# Patient Record
Sex: MALE | Race: WHITE | NOT HISPANIC OR LATINO | Employment: FULL TIME | URBAN - METROPOLITAN AREA
[De-identification: names, ages, dates, MRNs, and addresses within clinical notes are randomized per-mention and may not be internally consistent; named-entity substitution may affect disease eponyms.]

---

## 2020-08-20 ENCOUNTER — CONSULT (OUTPATIENT)
Dept: SLEEP MEDICINE | Facility: HOSPITAL | Age: 30
End: 2020-08-20

## 2020-08-20 VITALS
DIASTOLIC BLOOD PRESSURE: 64 MMHG | SYSTOLIC BLOOD PRESSURE: 137 MMHG | OXYGEN SATURATION: 94 % | RESPIRATION RATE: 16 BRPM | HEIGHT: 77 IN | TEMPERATURE: 97.7 F | WEIGHT: 191.2 LBS | HEART RATE: 43 BPM | BODY MASS INDEX: 22.58 KG/M2

## 2020-08-20 DIAGNOSIS — F12.90 MARIJUANA USE: ICD-10-CM

## 2020-08-20 DIAGNOSIS — G47.10 HYPERSOMNOLENCE: Primary | ICD-10-CM

## 2020-08-20 PROCEDURE — 99204 OFFICE O/P NEW MOD 45 MIN: CPT | Performed by: INTERNAL MEDICINE

## 2020-08-20 NOTE — PROGRESS NOTES
"     New Sleep Patient Office Visit      Patient Name: Jesus Scott    Referring Physician: Jena Hicks NP    Chief Complaint:    Chief Complaint   Patient presents with   • Sleeping Problem       History of Present Illness: Jesus Scott is a 29 y.o. male who is here today to establish care with Sleep Medicine.     29-year-old male coming in for initial evaluation.  Patient states that he is having problem with \"brain fog\" and excessive fatigue going on for little over a year.  States that generally when he wakes up in the morning does not feel rested and he goes out for a run and he feels better for little while but then gets into the same fatigue symptoms again.  Denies any preceding illnesses prior to this happening.  Growing up and no sleep issues.  Denies any symptom complex suggesting of narcolepsy.  No recent fevers.  No history of head trauma.  No history of seizures.  States that he is very active when he is outside a lot.  He has had multiple tick bites but no history of Lyme disease.  Denies any restless leg symptoms.  Denies any REM behavior disorder.  Denies any other parasomnias.    Patient takes about 30 minutes to go to sleep.  He works late in the evening as a .  Gets up work around 11 and goes to bed around 12 or 1 AM.  He then wakes up multiple times during the night sometimes to use the restroom and sometimes because of dry mouth.  He drinks 3-4 beers a night.  He also smokes marijuana nightly as well.  States that he quit drinking and smoking marijuana for about a week but that did not change his symptoms at all.    Patient apparently was sick about 3 weeks ago and he was tested and did not have COVID-19 infection.  But states that now he does feel somewhat dizzy when he gets up quickly but things are improving.  Denies any cataplexy symptoms.     Patient recently had blood work done at his primary care.  His TSH was 1.94, free T4 level was normal.  His comprehensive metabolic panel " was normal.  Hemoglobin was 15.6.    Patient states that lately is kind of feeling depressed more because of COVID-19 pandemic and how his job is affected.  Denies any overt depression.  But his fatigue and fogginess symptoms preceding.      Cowley Scale: 0/24    Estimated average amount of sleep per night: 7-8  Number of times  he wakes up at night: 3-4  Difficulty falling back asleep: yes  It usually takes 30 minutes to go to sleep.  He feels sleepy upon waking up: yes  Rotating or night shift work: no    Drowsiness/Sleepiness:  He exhibits the following:  excessive daytime sleepiness  excessive daytime fatigue  sleepy even when sleep time is increased    Snoring/Breathing:  He exhibits the following:  awoken with dry mouth    Reflux:  He describes the following:  NOne    Narcolepsy:  He exhibits the following:  none    RLS/PLMs:  He describes the following:  none    Insomnia:  He describes the following:  frequent awakenings  restless sleep    Parasomnia:  He exhibits the following:  None    Weight:  Weight change in the last year:  No change    Subjective      Review of Systems:   Review of Systems   Constitutional: Negative.    HENT: Negative.    Respiratory: Positive for chest tightness.    Cardiovascular: Negative.    Gastrointestinal: Negative.    Endocrine: Negative.    Musculoskeletal: Negative.    Skin: Negative.    Allergic/Immunologic: Positive for environmental allergies.   Neurological: Negative.    Hematological: Negative.    Psychiatric/Behavioral: Negative.    All other systems reviewed and are negative.      Past Medical History: History reviewed. No pertinent past medical history.    Past Surgical History: History reviewed. No pertinent surgical history.    Family History:   Family History   Problem Relation Age of Onset   • Pancreatic cancer Father    • Heart disease Maternal Grandmother    • Heart attack Maternal Grandmother    • Heart disease Maternal Grandfather    • Heart attack Maternal  "Grandfather    • Stroke Paternal Grandfather        Social History:   Social History     Socioeconomic History   • Marital status: Single     Spouse name: Not on file   • Number of children: Not on file   • Years of education: Not on file   • Highest education level: Not on file   Tobacco Use   • Smoking status: Current Every Day Smoker     Packs/day: 0.25     Years: 10.00     Pack years: 2.50     Types: Cigarettes     Start date: 2011   • Smokeless tobacco: Never Used   Substance and Sexual Activity   • Alcohol use: Yes     Alcohol/week: 25.0 standard drinks     Types: 25 Cans of beer per week   • Drug use: Yes     Types: Marijuana     Comment: nightly        Medications:   No current outpatient medications on file.    Allergies:   No Known Allergies    Objective     Physical Exam:  Vital Signs:   Vitals:    08/20/20 1056   BP: 137/64   Pulse: (!) 43   Resp: 16   Temp: 97.7 °F (36.5 °C)   TempSrc: Infrared   SpO2: 94%   Weight: 86.7 kg (191 lb 3.2 oz)   Height: 195.6 cm (77\")       Physical Exam   Constitutional: He is oriented to person, place, and time. He appears well-developed and well-nourished. No distress.   HENT:   Head: Normocephalic and atraumatic.   Nose: Nose normal.   Mouth/Throat: Oropharynx is clear and moist. No oropharyngeal exudate.   Thrush: None  Mallampati Score: 2    Eyes: Pupils are equal, round, and reactive to light. EOM are normal. Right eye exhibits no discharge. Left eye exhibits no discharge. No scleral icterus.   Neck: Neck supple. No tracheal deviation present. No thyromegaly present.   Cardiovascular: Normal rate, regular rhythm and normal heart sounds. Exam reveals no friction rub.   No murmur heard.  Pulmonary/Chest: Effort normal and breath sounds normal. No stridor. No respiratory distress. He has no wheezes. He has no rales.   Abdominal: Soft. He exhibits no distension. There is no tenderness. There is no guarding.   Musculoskeletal: He exhibits no edema or tenderness.   "   Clubbing: none   Lymphadenopathy:     He has no cervical adenopathy.   Neurological: He is alert and oriented to person, place, and time. No cranial nerve deficit. Coordination normal.   Skin: He is not diaphoretic.   Psychiatric: He has a normal mood and affect. His behavior is normal. Judgment and thought content normal.   Nursing note and vitals reviewed.      Results Review:     Patient home sleep study report reviewed and patient AHI 0.5.  Lowest saturation 92%.      Assessment / Plan      Assessment:   Problem List Items Addressed This Visit     None      Visit Diagnoses     Hypersomnolence    -  Primary    Relevant Orders    Lyme Disease IgG/IgM Antibodies    Polysomnography 4 or more parameters    Urine Drug Screen - Urine, Clean Catch    Multiple sleep latency test without CPAP    Marijuana use                Plan:      1.  Patient presenting with complains of fatigue and brain fogginess which lasts throughout the day.  Does not feel fully rested in the morning.  Denies any symptom suggesting of narcolepsy.  Differential diagnosis still remains wired with narcolepsy antibiotic hypersomnolence remain in the picture.  Discussed that sleep testing at home setting can sometimes miss mild sleep apnea and we will need to proceed with full night polysomnogram followed by daytime multiple sleep latency testing to evaluate for these disorders.  Sometime he can find other alternative disorders during sleep study as well which we can treat and help him with his quality of life.  He verbalized understanding and is agreeable to proceed with further testing.    2.  Recent blood work reviewed On patient's phone and found that all the other etiologies such as anemia, hypothyroidism have been ruled out.  No kidney disease or liver disease either.  Which is reassuring.  He has a lot of outdoor exposures.  Not sure if he ever had any Lyme disease.  Will send Lyme serology to rule that possibility out for chronic Lyme  disease.    3.  Advised to continue with increasing activity.    4.  Patient was counseled strongly to work towards quitting his alcohol intake and also  quitting marijuana use.  He also smokes quarter pack a day.  He will need to work on that eventually.  Patient states that he has no problem quitting drugs of abuse and hopefully that will help improve his symptoms in the long run.  Patient is comfortable with this plan.      We will go ahead and see him back in Clinic after sleep study and M SLT to go over the results and discuss further management options.  If idiopathic hypersomnolence persisting and if he is off drugs of abuse will consider stimulant medications.    Thank you for allowing us to participate in the care of this patient.  We will follow him closely with you.    Follow Up:   Follow up after PSG and MSLT.     Discussed plan of care in detail with patient today. Patient verbally understands and agrees.      Aidan Nicole MD  Pulmonary Critical Care and Sleep Medicine      Please note that portions of this note may have been completed with a voice recognition program. Efforts were made to edit the dictations, but occasionally words are mistranscribed.

## 2020-10-27 ENCOUNTER — APPOINTMENT (OUTPATIENT)
Dept: PREADMISSION TESTING | Facility: HOSPITAL | Age: 30
End: 2020-10-27

## 2020-10-27 PROCEDURE — U0004 COV-19 TEST NON-CDC HGH THRU: HCPCS

## 2020-10-27 PROCEDURE — C9803 HOPD COVID-19 SPEC COLLECT: HCPCS

## 2020-10-28 LAB — SARS-COV-2 RNA RESP QL NAA+PROBE: NOT DETECTED

## 2020-10-30 ENCOUNTER — HOSPITAL ENCOUNTER (OUTPATIENT)
Dept: SLEEP MEDICINE | Facility: HOSPITAL | Age: 30
Discharge: HOME | End: 2020-10-30
Admitting: INTERNAL MEDICINE

## 2020-10-30 DIAGNOSIS — G47.10 HYPERSOMNOLENCE: ICD-10-CM

## 2020-10-30 PROCEDURE — 95810 POLYSOM 6/> YRS 4/> PARAM: CPT | Performed by: INTERNAL MEDICINE

## 2020-10-30 PROCEDURE — 95810 POLYSOM 6/> YRS 4/> PARAM: CPT

## 2020-10-31 ENCOUNTER — HOSPITAL ENCOUNTER (OUTPATIENT)
Dept: SLEEP MEDICINE | Facility: HOSPITAL | Age: 30
Discharge: HOME | End: 2020-10-31
Admitting: INTERNAL MEDICINE

## 2020-10-31 VITALS
TEMPERATURE: 97.9 F | BODY MASS INDEX: 23.43 KG/M2 | DIASTOLIC BLOOD PRESSURE: 78 MMHG | WEIGHT: 198.41 LBS | HEIGHT: 77 IN | HEART RATE: 66 BPM | RESPIRATION RATE: 16 BRPM | OXYGEN SATURATION: 98 % | SYSTOLIC BLOOD PRESSURE: 120 MMHG

## 2020-10-31 VITALS — BODY MASS INDEX: 23.43 KG/M2 | WEIGHT: 198.41 LBS | HEART RATE: 66 BPM | OXYGEN SATURATION: 97 % | HEIGHT: 77 IN

## 2020-10-31 DIAGNOSIS — G47.10 HYPERSOMNOLENCE: ICD-10-CM

## 2020-10-31 LAB
AMPHET+METHAMPHET UR QL: NEGATIVE
AMPHETAMINES UR QL: NEGATIVE
BARBITURATES UR QL SCN: NEGATIVE
BENZODIAZ UR QL SCN: NEGATIVE
BUPRENORPHINE SERPL-MCNC: NEGATIVE NG/ML
CANNABINOIDS SERPL QL: POSITIVE
COCAINE UR QL: NEGATIVE
METHADONE UR QL SCN: NEGATIVE
OPIATES UR QL: NEGATIVE
OXYCODONE UR QL SCN: NEGATIVE
PCP UR QL SCN: NEGATIVE
PROPOXYPH UR QL: NEGATIVE
TRICYCLICS UR QL SCN: NEGATIVE

## 2020-10-31 PROCEDURE — 95805 MULTIPLE SLEEP LATENCY TEST: CPT

## 2020-10-31 PROCEDURE — 95805 MULTIPLE SLEEP LATENCY TEST: CPT | Performed by: INTERNAL MEDICINE

## 2020-10-31 PROCEDURE — 80306 DRUG TEST PRSMV INSTRMNT: CPT | Performed by: INTERNAL MEDICINE

## 2020-11-09 ENCOUNTER — LAB (OUTPATIENT)
Dept: LAB | Facility: HOSPITAL | Age: 30
End: 2020-11-09

## 2020-11-09 ENCOUNTER — LAB REQUISITION (OUTPATIENT)
Dept: LAB | Facility: HOSPITAL | Age: 30
End: 2020-11-09

## 2020-11-09 ENCOUNTER — OFFICE VISIT (OUTPATIENT)
Dept: SLEEP MEDICINE | Facility: HOSPITAL | Age: 30
End: 2020-11-09

## 2020-11-09 VITALS
OXYGEN SATURATION: 98 % | DIASTOLIC BLOOD PRESSURE: 72 MMHG | HEIGHT: 77 IN | HEART RATE: 51 BPM | SYSTOLIC BLOOD PRESSURE: 142 MMHG | WEIGHT: 199.4 LBS | BODY MASS INDEX: 23.54 KG/M2

## 2020-11-09 DIAGNOSIS — R53.83 FATIGUE, UNSPECIFIED TYPE: Primary | ICD-10-CM

## 2020-11-09 DIAGNOSIS — F12.90 MARIJUANA USE: ICD-10-CM

## 2020-11-09 DIAGNOSIS — Z00.00 ROUTINE GENERAL MEDICAL EXAMINATION AT A HEALTH CARE FACILITY: ICD-10-CM

## 2020-11-09 DIAGNOSIS — F41.8 SITUATIONAL ANXIETY: ICD-10-CM

## 2020-11-09 PROCEDURE — 36415 COLL VENOUS BLD VENIPUNCTURE: CPT | Performed by: INTERNAL MEDICINE

## 2020-11-09 PROCEDURE — 99214 OFFICE O/P EST MOD 30 MIN: CPT | Performed by: INTERNAL MEDICINE

## 2020-11-09 NOTE — PROGRESS NOTES
Follow Up Office Visit      Patient Name: Jesus Scott    Chief Complaint:    Chief Complaint   Patient presents with   • Follow-up       History of Present Illness: Jesus Scott is a 30 y.o. male who is here today for follow up of sleep study    30-year-old male seen in follow-up today.  Patient was initially seen with complains of fatigue and brain fogginess.  We referred him for sleep study followed by multiple sleep latency test.  He comes today for follow-up.  We had also counseled him to cut down on his marijuana use as well as alcohol intake.  He states that he did try that for couple of weeks and he said that he was sleeping better.  He woke up around 7:30 AM most of the days and did feel better still little bit of brain fogginess but improved from before.  He still have anxiety around his job he does not like his job currently.  He is planning to go to school in spring to study  and he is excited about that.  He will be working less shifts in his job at that time.  We talked about possibility of depression but he does not have any symptoms of major depression at this time.  I think it is mostly situational anxiety and depression affecting him currently.    Subjective      Review of Systems:   Review of Systems   Constitutional: Positive for fatigue.   HENT: Negative.    Respiratory: Positive for chest tightness.    Cardiovascular: Negative.    Gastrointestinal: Negative.    Endocrine: Negative.    Musculoskeletal: Negative.    Skin: Negative.    Allergic/Immunologic: Positive for environmental allergies.   Neurological: Negative.    Hematological: Negative.    Psychiatric/Behavioral: Negative.    All other systems reviewed and are negative.      The following portions of the patient's history were reviewed and updated as appropriate: allergies, current medications, past family history, past medical history, past social history, past surgical history and problem  "list.    Objective     Physical Exam:  Vital Signs:   Vitals:    11/09/20 0906   BP: 142/72   Pulse: 51   SpO2: 98%   Weight: 90.4 kg (199 lb 6.4 oz)   Height: 195.6 cm (77\")       Physical Exam  Constitutional: He is oriented to person, place, and time. He appears well-developed and well-nourished. No distress.   HENT:   Head: Normocephalic and atraumatic.   Nose: Nose normal.   Mouth/Throat: Oropharynx is clear and moist. No oropharyngeal exudate.   Mallampati Score: 2    Cardiovascular: Normal rate, regular rhythm and normal heart sounds. Exam reveals no friction rub.   No murmur heard.  Pulmonary/Chest: Effort normal and breath sounds normal. No stridor. No respiratory distress. He has no wheezes. He has no rales.   Musculoskeletal: He exhibits no edema or tenderness.   Clubbing: none   Neurological: He is alert and oriented to person, place, and time. No cranial nerve deficit. Coordination normal.   Skin: He is not diaphoretic.   Psychiatric: He has a normal mood and affect. His behavior is normal. Judgment and thought content normal.   Nursing note and vitals reviewed.      Results Review:   Lyme serology pending.     PSG study reviewed and did not show any significant sleep disordered breathing.  No sleep-related hypoxia noted.  No significant PLM's noted.  No sleep onset REM.  Slight increase of N3 sleep noted.    Multiple sleep latency test did not show any evidence of narcolepsy.  Patient only had sleep and 1 out of 5 naps.  No sleep onset REM.'s.    Assessment / Plan      Assessment:   Problem List Items Addressed This Visit     None      Visit Diagnoses     Fatigue, unspecified type    -  Primary    Marijuana use        Situational anxiety              Plan:   1.  Study results were reviewed in detail with the patient.  Discussed with him that he does not fit with criteria of narcolepsy or idiopathic hypersomnolence.  He did have slight increase of N3 sleep.  Did not have significant arousals.  No PLM's " noted.  He states that he was surprised that he slept that well in the lab compared to his own place.  Given all these findings I think he have some component of situational depression.  He does not like his job that is affecting his daytime functioning.  It is complicated by his substance use.  Advised that he needs to stay away from marijuana and alcohol and try to improve his sleep hygiene.  He is planning to go back to school in coming spring 2 study and he is quite excited about that.  He denies any symptoms suggesting of depression at this time.  That will need close follow-up with primary care to further evaluate.  Patient was advised to keep his good sleep hygiene habits and improve his sleep.  Discussed that we are not finding any pathological findings during his sleep studies which will necessitate any pharmacologic treatment at this time.  He is comfortable with this plan and agreeable.    2.  Patient previous history of tick bites.  He has not done Lyme serology at this time.  He was encouraged to get this worked on today.  He will stop by the lab.  I will get in touch with him once I have the labs back on that.      I would like to see him back in 6 months to see his symptoms get better with starting school and working less in his current job which he despises.  He verbalized understanding and had no further questions at this time.    Follow Up:   Return in about 6 months (around 5/9/2021) for Recheck.    Discussed plan of care in detail with patient today. Patient verbally understands and agrees.       Aidan Nicole MD  Pulmonary Critical Care and Sleep Medicine    Please note that portions of this note may have been completed with a voice recognition program. Efforts were made to edit the dictations, but occasionally words are mistranscribed.